# Patient Record
Sex: FEMALE | Race: WHITE | NOT HISPANIC OR LATINO | Employment: OTHER | URBAN - METROPOLITAN AREA
[De-identification: names, ages, dates, MRNs, and addresses within clinical notes are randomized per-mention and may not be internally consistent; named-entity substitution may affect disease eponyms.]

---

## 2018-12-19 ENCOUNTER — TELEPHONE (OUTPATIENT)
Dept: VASCULAR SURGERY | Facility: CLINIC | Age: 43
End: 2018-12-19

## 2018-12-19 NOTE — TELEPHONE ENCOUNTER
Spoke to caregiver Mare, informed her that pt's ultrasound reviewed by Dr Barber, negative for arterial stenosis or occlusion, instructed her to reach out to Dr Smith for next steps.  Mare voices understanding, states she will call Dr Smith.

## 2018-12-19 NOTE — TELEPHONE ENCOUNTER
----- Message from Ana Maria Vargas sent at 12/19/2018 11:17 AM CST -----  Contact: Mare (caregiver)  Type:  Sooner Apoointment Request    Caller is requesting a sooner appointment.  Caller declined first available appointment listed below.  Caller will not accept being placed on the waitlist and is requesting a message be sent to doctor.    Name of Caller:  Vanessa Garvey  Best Call Back Number:  [phone]  Additional Information:  Patient was referred by Dr. Billie Smith @ [facility]--gave the caregiver the fax number & information to get the referral in the system--patient needs to be seen as soon as possible for poor circulation--prefers an appt for a Tuesday--please advise--thank you